# Patient Record
Sex: FEMALE | Race: BLACK OR AFRICAN AMERICAN | NOT HISPANIC OR LATINO | Employment: FULL TIME | URBAN - METROPOLITAN AREA
[De-identification: names, ages, dates, MRNs, and addresses within clinical notes are randomized per-mention and may not be internally consistent; named-entity substitution may affect disease eponyms.]

---

## 2023-09-21 LAB
CLUE CELLS: NORMAL
NUGENT SCORE: 1
YEAST: NORMAL

## 2023-12-05 DIAGNOSIS — B00.9 HERPESVIRAL INFECTION, UNSPECIFIED: ICD-10-CM

## 2023-12-07 ENCOUNTER — APPOINTMENT (OUTPATIENT)
Dept: OBSTETRICS AND GYNECOLOGY | Facility: CLINIC | Age: 33
End: 2023-12-07
Payer: COMMERCIAL

## 2024-01-11 ENCOUNTER — LAB (OUTPATIENT)
Dept: LAB | Facility: LAB | Age: 34
End: 2024-01-11
Payer: COMMERCIAL

## 2024-01-11 ENCOUNTER — OFFICE VISIT (OUTPATIENT)
Dept: OBSTETRICS AND GYNECOLOGY | Facility: CLINIC | Age: 34
End: 2024-01-11
Payer: COMMERCIAL

## 2024-01-11 VITALS
HEIGHT: 66 IN | DIASTOLIC BLOOD PRESSURE: 68 MMHG | BODY MASS INDEX: 32.47 KG/M2 | SYSTOLIC BLOOD PRESSURE: 114 MMHG | WEIGHT: 202 LBS

## 2024-01-11 DIAGNOSIS — E66.09 CLASS 1 OBESITY DUE TO EXCESS CALORIES WITH SERIOUS COMORBIDITY AND BODY MASS INDEX (BMI) OF 30.0 TO 30.9 IN ADULT: ICD-10-CM

## 2024-01-11 DIAGNOSIS — Z01.419 ENCOUNTER FOR ANNUAL ROUTINE GYNECOLOGICAL EXAMINATION: ICD-10-CM

## 2024-01-11 DIAGNOSIS — Z01.419 ENCOUNTER FOR ANNUAL ROUTINE GYNECOLOGICAL EXAMINATION: Primary | ICD-10-CM

## 2024-01-11 DIAGNOSIS — Z87.42 HISTORY OF VAGINITIS: ICD-10-CM

## 2024-01-11 DIAGNOSIS — Z86.19 HISTORY OF HERPES GENITALIS: ICD-10-CM

## 2024-01-11 LAB
25(OH)D3 SERPL-MCNC: 19 NG/ML (ref 30–100)
TSH SERPL-ACNC: 1.51 MIU/L (ref 0.44–3.98)

## 2024-01-11 PROCEDURE — 87624 HPV HI-RISK TYP POOLED RSLT: CPT

## 2024-01-11 PROCEDURE — 87205 SMEAR GRAM STAIN: CPT

## 2024-01-11 PROCEDURE — 3008F BODY MASS INDEX DOCD: CPT | Performed by: OBSTETRICS & GYNECOLOGY

## 2024-01-11 PROCEDURE — 36415 COLL VENOUS BLD VENIPUNCTURE: CPT

## 2024-01-11 PROCEDURE — 99395 PREV VISIT EST AGE 18-39: CPT | Performed by: OBSTETRICS & GYNECOLOGY

## 2024-01-11 PROCEDURE — 88175 CYTOPATH C/V AUTO FLUID REDO: CPT

## 2024-01-11 PROCEDURE — 84443 ASSAY THYROID STIM HORMONE: CPT

## 2024-01-11 PROCEDURE — 1036F TOBACCO NON-USER: CPT | Performed by: OBSTETRICS & GYNECOLOGY

## 2024-01-11 PROCEDURE — 82306 VITAMIN D 25 HYDROXY: CPT

## 2024-01-11 RX ORDER — NAPROXEN SODIUM 220 MG
TABLET ORAL
COMMUNITY
Start: 2010-06-01

## 2024-01-11 RX ORDER — VALACYCLOVIR HCL 100 %
POWDER (GRAM) MISCELLANEOUS
COMMUNITY
Start: 2021-09-15 | End: 2024-01-11 | Stop reason: SDUPTHER

## 2024-01-11 RX ORDER — BLOOD-GLUCOSE METER
KIT MISCELLANEOUS 4 TIMES DAILY
COMMUNITY
Start: 2019-02-25

## 2024-01-11 RX ORDER — LANCETS
EACH MISCELLANEOUS
COMMUNITY
Start: 2010-06-01

## 2024-01-11 RX ORDER — ACETAMINOPHEN 325 MG/1
TABLET ORAL EVERY 6 HOURS
COMMUNITY
Start: 2020-12-13

## 2024-01-11 RX ORDER — AZELASTINE HYDROCHLORIDE 0.5 MG/ML
SOLUTION/ DROPS OPHTHALMIC
COMMUNITY
Start: 2023-01-13

## 2024-01-11 RX ORDER — VALACYCLOVIR HYDROCHLORIDE 500 MG/1
500 TABLET, FILM COATED ORAL DAILY
Qty: 90 TABLET | Refills: 3 | Status: SHIPPED | OUTPATIENT
Start: 2024-01-11 | End: 2025-01-10

## 2024-01-11 NOTE — PATIENT INSTRUCTIONS
Thanks for coming in today for your annual GYN exam.    A Pap smear was sent.  Results should be available in the next few weeks.  However, if you have been unable to review the results by the middle of next month please give the office a call.    Routine cultures are sent today.  Results should be available in the next 24 to 48 hours.  You may call the office and select option number to speak with the nurse to obtain the results.    Follow-up with our fitter me program to help with weight loss.    A refill of your Valtrex has been sent to the pharmacy.    Follow-up with your PCP and other healthcare specialist as needed.    Feel free to call the office with any problems, questions or concerns prior to your next scheduled visit.

## 2024-01-11 NOTE — PROGRESS NOTES
33-year-old obese -0-1-1 -American woman presents today for annual GYN exam.     She has had some depression.  She has been seeing a therapist and states that she is doing okay.    She had BV and believes that it has essentially resolved.  She request a refill of her Valtrex and vitamin D.  She would like some assistance with weight loss.    GynHx reviewed     Subjective   Patient ID: Lj Avilez is a 33 y.o. female who presents for Annual Exam (Med refill /Last PAP=2021 negative /Decline Chaperone. Maxim BRODY MA II/).  Objective   Physical Exam  Exam conducted with a chaperone present.   Constitutional:       Appearance: She is obese.   HENT:      Head: Normocephalic.      Right Ear: External ear normal.      Left Ear: External ear normal.      Nose: Nose normal.      Mouth/Throat:      Mouth: Mucous membranes are moist.   Eyes:      Extraocular Movements: Extraocular movements intact.      Pupils: Pupils are equal, round, and reactive to light.   Cardiovascular:      Rate and Rhythm: Normal rate and regular rhythm.   Pulmonary:      Effort: Pulmonary effort is normal.      Breath sounds: Normal breath sounds.   Chest:   Breasts:     Right: Normal.      Left: Normal.   Abdominal:      Palpations: Abdomen is soft.   Genitourinary:     General: Normal vulva.      Labia:         Right: No rash or lesion.         Left: No rash or lesion.       Vagina: Normal.      Cervix: Normal.      Uterus: Normal.       Adnexa: Right adnexa normal and left adnexa normal.        Right: No tenderness or fullness.          Left: No tenderness or fullness.     Musculoskeletal:         General: Normal range of motion.      Cervical back: Normal range of motion.   Skin:     General: Skin is warm and dry.   Neurological:      General: No focal deficit present.      Mental Status: She is alert and oriented to person, place, and time.   Psychiatric:         Mood and Affect: Mood normal.         Behavior: Behavior normal.          Thought Content: Thought content normal.         Judgment: Judgment normal.     A/P: APE     -  Pap sent     -  PCP F/U     -  Valtrex refill     -  Fitter Me program     -  Declines contraception    -  BV and GC swab for recent vaginitis

## 2024-01-12 LAB
CLUE CELLS VAG LPF-#/AREA: ABNORMAL /[LPF]
NUGENT SCORE: 7
YEAST VAG WET PREP-#/AREA: ABNORMAL

## 2024-01-15 ENCOUNTER — TELEPHONE (OUTPATIENT)
Dept: OBSTETRICS AND GYNECOLOGY | Facility: CLINIC | Age: 34
End: 2024-01-15
Payer: COMMERCIAL

## 2024-01-15 NOTE — TELEPHONE ENCOUNTER
--Attempted to reach pt vm is full  --- Message from Hetal Callahan MD sent at 1/14/2024 10:52 PM EST -----  The patient may use Metrogel, Flagyl or Solesec for BV. Please confirm the patient's preference, allergies and pharmacy and then pend and send rx.

## 2024-01-18 ENCOUNTER — TELEPHONE (OUTPATIENT)
Dept: OBSTETRICS AND GYNECOLOGY | Facility: CLINIC | Age: 34
End: 2024-01-18
Payer: COMMERCIAL

## 2024-01-18 DIAGNOSIS — B96.89 BACTERIAL VAGINOSIS: ICD-10-CM

## 2024-01-18 DIAGNOSIS — E55.9 VITAMIN D DEFICIENCY: Primary | ICD-10-CM

## 2024-01-18 DIAGNOSIS — N76.0 BACTERIAL VAGINOSIS: ICD-10-CM

## 2024-01-18 NOTE — TELEPHONE ENCOUNTER
Pt verified by name and .  Pt is aware of Dr. Callahan's message:  Hetal Callahan MD  [facility] [place] [address] [facility]  Vitamin D 50,000 International Units each week for 12 weeks and then 2,000 Internat'l units daily. Pend and send after confirming pharmacy. Thanks  Pt is also aware she has bv.  Pt would like solosec sent to pharmacy.

## 2024-01-19 RX ORDER — ERGOCALCIFEROL 1.25 MG/1
50000 CAPSULE ORAL
Qty: 12 CAPSULE | Refills: 0 | Status: SHIPPED | OUTPATIENT
Start: 2024-01-19 | End: 2024-05-30

## 2024-01-19 RX ORDER — SECNIDAZOLE 2 G/4.8G
1 GRANULE ORAL ONCE
Qty: 1 EACH | Refills: 0 | Status: SHIPPED | OUTPATIENT
Start: 2024-01-19 | End: 2024-01-19

## 2024-01-30 ENCOUNTER — OFFICE VISIT (OUTPATIENT)
Dept: PRIMARY CARE | Facility: CLINIC | Age: 34
End: 2024-01-30
Payer: COMMERCIAL

## 2024-01-30 ENCOUNTER — LAB (OUTPATIENT)
Dept: LAB | Facility: LAB | Age: 34
End: 2024-01-30
Payer: COMMERCIAL

## 2024-01-30 VITALS
DIASTOLIC BLOOD PRESSURE: 73 MMHG | HEIGHT: 66 IN | BODY MASS INDEX: 32.29 KG/M2 | WEIGHT: 200.9 LBS | HEART RATE: 81 BPM | SYSTOLIC BLOOD PRESSURE: 108 MMHG

## 2024-01-30 DIAGNOSIS — E10.649 UNCONTROLLED TYPE 1 DIABETES MELLITUS WITH HYPOGLYCEMIA WITHOUT COMA (MULTI): ICD-10-CM

## 2024-01-30 DIAGNOSIS — E66.09 CLASS 1 OBESITY DUE TO EXCESS CALORIES WITH SERIOUS COMORBIDITY AND BODY MASS INDEX (BMI) OF 30.0 TO 30.9 IN ADULT: ICD-10-CM

## 2024-01-30 DIAGNOSIS — J45.20 MILD INTERMITTENT EXTRINSIC ASTHMA WITHOUT COMPLICATION (HHS-HCC): Primary | ICD-10-CM

## 2024-01-30 LAB
CHOLEST SERPL-MCNC: 228 MG/DL (ref 0–199)
CHOLESTEROL/HDL RATIO: 4.8
EST. AVERAGE GLUCOSE BLD GHB EST-MCNC: 180 MG/DL
HBA1C MFR BLD: 7.9 %
HDLC SERPL-MCNC: 47.9 MG/DL
LDLC SERPL CALC-MCNC: 164 MG/DL
NON HDL CHOLESTEROL: 180 MG/DL (ref 0–149)
TRIGL SERPL-MCNC: 81 MG/DL (ref 0–149)
VLDL: 16 MG/DL (ref 0–40)

## 2024-01-30 PROCEDURE — 3008F BODY MASS INDEX DOCD: CPT | Performed by: FAMILY MEDICINE

## 2024-01-30 PROCEDURE — 80061 LIPID PANEL: CPT

## 2024-01-30 PROCEDURE — 36415 COLL VENOUS BLD VENIPUNCTURE: CPT

## 2024-01-30 PROCEDURE — 3051F HG A1C>EQUAL 7.0%<8.0%: CPT | Performed by: FAMILY MEDICINE

## 2024-01-30 PROCEDURE — 3078F DIAST BP <80 MM HG: CPT | Performed by: FAMILY MEDICINE

## 2024-01-30 PROCEDURE — 3074F SYST BP LT 130 MM HG: CPT | Performed by: FAMILY MEDICINE

## 2024-01-30 PROCEDURE — 83036 HEMOGLOBIN GLYCOSYLATED A1C: CPT

## 2024-01-30 PROCEDURE — 3050F LDL-C >= 130 MG/DL: CPT | Performed by: FAMILY MEDICINE

## 2024-01-30 PROCEDURE — 99204 OFFICE O/P NEW MOD 45 MIN: CPT | Performed by: FAMILY MEDICINE

## 2024-01-30 PROCEDURE — 1036F TOBACCO NON-USER: CPT | Performed by: FAMILY MEDICINE

## 2024-01-30 PROCEDURE — 99214 OFFICE O/P EST MOD 30 MIN: CPT | Performed by: FAMILY MEDICINE

## 2024-01-30 ASSESSMENT — PAIN SCALES - GENERAL: PAINLEVEL: 0-NO PAIN

## 2024-01-30 NOTE — PROGRESS NOTES
"Lj Gillespie is a 33 y.o. female here for initial evaluation for treatment of obesity.    Past Medical History:   Diagnosis Date    Acute vaginitis     Vaginal infection    Allergy status to unspecified drugs, medicaments and biological substances     History of seasonal allergies    Anxiety disorder, unspecified     Anxiety and depression    Encounter for removal of intrauterine contraceptive device 08/22/2022    Encounter for IUD removal    Other conditions influencing health status     History of pregnancy    Other conditions influencing health status     Termination of pregnancy    Other specified health status     No pertinent past surgical history    Personal history of other specified conditions     History of abnormal Pap smear    Type 1 diabetes mellitus with hypoglycemia with coma (CMS/[facility])     Type 1 diabetes mellitus with hypoglycemic coma    Unspecified sexually transmitted disease     STD (female)       Tobacco Use: Low Risk  (1/30/2024)    Patient History     Smoking Tobacco Use: Never     Smokeless Tobacco Use: Never     Passive Exposure: Not on file     Alcohol Use: Not on file     Of greatest significance, Lj was diagnosed with type 1 diabetes at age 31, which is unusual. She uses an insulin pump and type 1 diabetes has been confirmed. She has experienced some weight gain since stating the insulin pump. She has mild intermittent asthma, but no other chronic illnesses. She neither smokes nor drinks alcohol.   The family history is significant for a father who is 69, obese, and has type 2 diabetes. Her mother is 64 and has asymptomatic carotid stenosis. She has no siblings though her mother. On her father's side, there is a half brother who is morbidly obese and \"on the verge\" of developing type 2 diabetes. Lj has a healthy 11-year-old daughter with whom she lives.     She has tried improved dietary habits for short periods of time and has exercises periodically for short periods, " without sustained effort or weight loss.     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 On a typical day, Lj Avilez wakes up at  6AM, gets her daugher ready for school, but then goes back to sleep until 8AM. She skips breakfast, first eating leftovers around 2PM. She next eats dinner with her daughter around 8PM. Not much snacking. She drinks water and juice. She is sedentary. She feels well today and is motivated to lose weight to look and feel better.       On physical examination   Vitals:    01/30/24 1207   BP: 108/73   Pulse: 81       Body mass index is 32.43 kg/m².      Overall Impression: Very pleasant young woman who is candid about her unhealthy habits. No recent Hba1C% or lipid panel which we'll get today. She is interested in phentermine in the future.    Goals included our Standard Fitter me goals with implementation counseling by Dr. Bertrand Hebert. Followup in 3months.     No food or drink after 7PM, Drink only water at all times., Have a protein-rich breakfast within 30 minutes of waking up., and Thirty minutes of continuous physical activity 7 days a week.

## 2024-02-21 RX ORDER — VALACYCLOVIR HYDROCHLORIDE 1 G/1
1000 TABLET, FILM COATED ORAL DAILY
Qty: 90 TABLET | Refills: 3 | Status: SHIPPED | OUTPATIENT
Start: 2024-02-21

## 2024-05-23 DIAGNOSIS — E55.9 VITAMIN D DEFICIENCY: ICD-10-CM

## 2024-05-30 RX ORDER — ERGOCALCIFEROL 1.25 MG/1
50000 CAPSULE ORAL
Qty: 12 CAPSULE | Refills: 0 | Status: SHIPPED | OUTPATIENT
Start: 2024-06-02 | End: 2024-08-19

## 2024-06-25 ENCOUNTER — APPOINTMENT (OUTPATIENT)
Dept: PRIMARY CARE | Facility: CLINIC | Age: 34
End: 2024-06-25
Payer: COMMERCIAL

## 2024-08-28 ENCOUNTER — OFFICE VISIT (OUTPATIENT)
Dept: OBSTETRICS AND GYNECOLOGY | Facility: CLINIC | Age: 34
End: 2024-08-28
Payer: COMMERCIAL

## 2024-08-28 VITALS
BODY MASS INDEX: 30.66 KG/M2 | HEART RATE: 83 BPM | HEIGHT: 65 IN | DIASTOLIC BLOOD PRESSURE: 78 MMHG | SYSTOLIC BLOOD PRESSURE: 115 MMHG | OXYGEN SATURATION: 98 % | WEIGHT: 184 LBS

## 2024-08-28 DIAGNOSIS — Z11.3 SCREENING EXAMINATION FOR STI: ICD-10-CM

## 2024-08-28 DIAGNOSIS — N89.8 VAGINAL DISCHARGE: Primary | ICD-10-CM

## 2024-08-28 PROCEDURE — 99213 OFFICE O/P EST LOW 20 MIN: CPT

## 2024-08-28 PROCEDURE — 1036F TOBACCO NON-USER: CPT

## 2024-08-28 PROCEDURE — 3078F DIAST BP <80 MM HG: CPT

## 2024-08-28 PROCEDURE — 87661 TRICHOMONAS VAGINALIS AMPLIF: CPT

## 2024-08-28 PROCEDURE — 3050F LDL-C >= 130 MG/DL: CPT

## 2024-08-28 PROCEDURE — 87491 CHLMYD TRACH DNA AMP PROBE: CPT

## 2024-08-28 PROCEDURE — 87591 N.GONORRHOEAE DNA AMP PROB: CPT

## 2024-08-28 PROCEDURE — 3008F BODY MASS INDEX DOCD: CPT

## 2024-08-28 PROCEDURE — 3074F SYST BP LT 130 MM HG: CPT

## 2024-08-28 PROCEDURE — 87205 SMEAR GRAM STAIN: CPT

## 2024-08-28 PROCEDURE — 3051F HG A1C>EQUAL 7.0%<8.0%: CPT

## 2024-08-28 NOTE — PROGRESS NOTES
"Subjective   Patient ID: Lj Avilez is a 34 y.o. female who presents for STD testing.    HPI  Patient presents to the office today with concern for vaginal discharge.  Had an  in early July.  States that she had intercourse sooner than when she was told that she could have intercourse again after having her .  Believes that may have \"thrown something off.\"  Increased vaginal discharge, slight odor.  Denies any itching or burning.  Denies pelvic pain.  Desires STI screening.  Hx of HSV.  States that she has been having more frequent outbreaks in the past few months, taking her Valtrex daily for suppression.  Using condoms.     Review of Systems   Genitourinary:  Positive for vaginal discharge. Negative for pelvic pain.   All other systems reviewed and are negative.      Objective   Physical Exam  Constitutional:       Appearance: Normal appearance.   HENT:      Head: Normocephalic.   Pulmonary:      Effort: Pulmonary effort is normal.   Genitourinary:     General: Normal vulva.      Vagina: Vaginal discharge present.      Cervix: Normal. No lesion.      Comments: Increased, thin white vaginal discharge.  Skin:     General: Skin is warm and dry.   Neurological:      Mental Status: She is alert and oriented to person, place, and time.   Psychiatric:         Mood and Affect: Mood normal.         Assessment/Plan   Diagnoses and all orders for this visit:  Vaginal discharge  -     C. trachomatis + N. gonorrhoeae, Amplified  -     Trichomonas vaginalis, Amplified  -     Vaginitis Gram Stain For Bacterial Vaginosis + Yeast  Screening examination for STI    Will await results prior to treatment.  Prefers PO treatment.     Patient reports understanding; all questions answered at this time.    RASHEED Hollingsworth 24 2:57 PM   "

## 2024-08-29 LAB
C TRACH RRNA SPEC QL NAA+PROBE: NEGATIVE
N GONORRHOEA DNA SPEC QL PROBE+SIG AMP: NEGATIVE
T VAGINALIS RRNA SPEC QL NAA+PROBE: NEGATIVE

## 2024-08-30 LAB
BACTERIAL VAGINOSIS VAG-IMP: NORMAL
CLUE CELLS VAG LPF-#/AREA: NORMAL /[LPF]
NUGENT SCORE: 4
YEAST VAG WET PREP-#/AREA: NORMAL

## 2024-09-04 ENCOUNTER — TELEPHONE (OUTPATIENT)
Dept: OBSTETRICS AND GYNECOLOGY | Facility: CLINIC | Age: 34
End: 2024-09-04
Payer: COMMERCIAL

## 2024-09-04 NOTE — TELEPHONE ENCOUNTER
Contacted pt and verified name and .  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 Pt is concerned that she wasn't being properly treated. It was noted that in 2024 Dr Ewnig wrote for her to take 1000 mg of valtrex daily. Pt states she was only taking 500 mg daily. Pt advised to increase to 1000mg daily an follow up with her pharmacy to if the order Dr. Ewing wrote is still active and if not to call us back.  Patient states understanding and has no questions at this time.

## 2024-10-28 ENCOUNTER — PATIENT MESSAGE (OUTPATIENT)
Dept: OBSTETRICS AND GYNECOLOGY | Facility: CLINIC | Age: 34
End: 2024-10-28
Payer: COMMERCIAL

## 2024-10-28 DIAGNOSIS — B00.9 HERPESVIRAL INFECTION, UNSPECIFIED: ICD-10-CM

## 2024-10-29 RX ORDER — VALACYCLOVIR HYDROCHLORIDE 1 G/1
1000 TABLET, FILM COATED ORAL DAILY
Qty: 90 TABLET | Refills: 0 | Status: SHIPPED | OUTPATIENT
Start: 2024-10-29 | End: 2025-01-27

## 2025-01-16 ENCOUNTER — APPOINTMENT (OUTPATIENT)
Dept: OBSTETRICS AND GYNECOLOGY | Facility: CLINIC | Age: 35
End: 2025-01-16
Payer: COMMERCIAL

## 2025-01-26 DIAGNOSIS — B00.9 HERPESVIRAL INFECTION, UNSPECIFIED: ICD-10-CM

## 2025-01-26 DIAGNOSIS — Z86.19 HISTORY OF HERPES GENITALIS: ICD-10-CM

## 2025-01-29 RX ORDER — VALACYCLOVIR HYDROCHLORIDE 500 MG/1
500 TABLET, FILM COATED ORAL DAILY
Qty: 90 TABLET | Refills: 3 | Status: SHIPPED | OUTPATIENT
Start: 2025-01-29

## 2025-01-29 RX ORDER — VALACYCLOVIR HYDROCHLORIDE 1 G/1
1000 TABLET, FILM COATED ORAL DAILY
Qty: 90 TABLET | Refills: 0 | Status: SHIPPED | OUTPATIENT
Start: 2025-01-29 | End: 2025-04-29

## 2025-03-05 ENCOUNTER — APPOINTMENT (OUTPATIENT)
Dept: OBSTETRICS AND GYNECOLOGY | Facility: CLINIC | Age: 35
End: 2025-03-05
Payer: COMMERCIAL

## 2025-03-05 VITALS
HEIGHT: 65 IN | BODY MASS INDEX: 32.49 KG/M2 | SYSTOLIC BLOOD PRESSURE: 106 MMHG | WEIGHT: 195 LBS | DIASTOLIC BLOOD PRESSURE: 60 MMHG

## 2025-03-05 DIAGNOSIS — Z11.3 ROUTINE SCREENING FOR STI (SEXUALLY TRANSMITTED INFECTION): Primary | ICD-10-CM

## 2025-03-05 DIAGNOSIS — Z30.09 ENCOUNTER FOR COUNSELING REGARDING CONTRACEPTION: ICD-10-CM

## 2025-03-05 PROCEDURE — 3074F SYST BP LT 130 MM HG: CPT | Performed by: OBSTETRICS & GYNECOLOGY

## 2025-03-05 PROCEDURE — 1036F TOBACCO NON-USER: CPT | Performed by: OBSTETRICS & GYNECOLOGY

## 2025-03-05 PROCEDURE — 3078F DIAST BP <80 MM HG: CPT | Performed by: OBSTETRICS & GYNECOLOGY

## 2025-03-05 PROCEDURE — 3008F BODY MASS INDEX DOCD: CPT | Performed by: OBSTETRICS & GYNECOLOGY

## 2025-03-05 PROCEDURE — 99395 PREV VISIT EST AGE 18-39: CPT | Performed by: OBSTETRICS & GYNECOLOGY

## 2025-03-05 RX ORDER — NORETHINDRONE ACETATE AND ETHINYL ESTRADIOL 1MG-20(21)
1 KIT ORAL DAILY
Qty: 28 TABLET | Refills: 11 | Status: SHIPPED | OUTPATIENT
Start: 2025-03-05 | End: 2026-03-05

## 2025-03-05 ASSESSMENT — ENCOUNTER SYMPTOMS
SHORTNESS OF BREATH: 0
WEAKNESS: 0
HEADACHES: 0
DYSURIA: 0
PALPITATIONS: 0
DIARRHEA: 0
FEVER: 0
HEMATURIA: 0
DIZZINESS: 0
ABDOMINAL PAIN: 0
CONSTIPATION: 0
FREQUENCY: 0
NAUSEA: 0
CHILLS: 0
VOMITING: 0
COUGH: 0

## 2025-03-05 ASSESSMENT — PAIN SCALES - GENERAL: PAINLEVEL_OUTOF10: 0-NO PAIN

## 2025-03-05 NOTE — PROGRESS NOTES
Well Woman Visit    SUBJECTIVE  34 y.o.  female presents for well woman exam     OB/GYN History  OB History    Para Term  AB Living   3 1 1   2 1   SAB IAB Ectopic Multiple Live Births     2     1      # Outcome Date GA Lbr Vinod/2nd Weight Sex Type Anes PTL Lv   3 Term      Vag-Spont   АННА   2 IAB            1 IAB                Menstrual status: Having periods  Patient's last menstrual period was 2025 (exact date).  Menses: Regular  Abnormal bleeding: No  Discharge: No  Pelvic pain: No  Pelvic prolapse: No  Urinary/fecal incontinence or overactive bladder: No  Breast concerns: No  Mood concerns: No    Social History     Substance and Sexual Activity   Sexual Activity Yes    Partners: Male    Birth control/protection: Condom Male     Sexually transmitted infections:no past history  History of abnormal pap: No  Last pap: approximate date 2024 and was normal  Sexual concerns: No  Desire to become pregnant in the next year: No    Other: Taking valtrex 1000 mg BID. Discussed I think it reasonable to do 500 mg BID or 1g daily given she is immunocompromised but 1 g BID may increase risk of side effects     The following portions of the chart were reviewed this encounter and updated as appropriate:    Tobacco  Allergies  Meds  Problems  Med Hx  Surg Hx  Fam Hx          Screenings  Social Drivers of Health     Tobacco Use: Low Risk  (3/5/2025)    Patient History     Smoking Tobacco Use: Never     Smokeless Tobacco Use: Never     Passive Exposure: Not on file   Alcohol Use: Not At Risk (2024)    Received from [facility]    AUDIT-C     Frequency of Alcohol Consumption: Monthly or less     Average Number of Drinks: 1 or 2     Frequency of Binge Drinking: Less than monthly   Financial Resource Strain: Low Risk  (2024)    Received from [facility]    Overall Financial Resource Strain (CARDIA)     Difficulty of Paying Living Expenses: Not hard at all   Food Insecurity: No  Food Insecurity (6/27/2024)    Received from [facility]    Hunger Vital Sign     Worried About Running Out of Food in the Last Year: Never true     Ran Out of Food in the Last Year: Never true   Transportation Needs: No Transportation Needs (6/27/2024)    Received from [facility]    PRAPARE - Transportation     Lack of Transportation (Medical): No     Lack of Transportation (Non-Medical): No   Physical Activity: Insufficiently Active (6/27/2024)    Received from [facility]    Exercise Vital Sign     Days of Exercise per Week: 1 day     Minutes of Exercise per Session: 10 min   Stress: No Stress Concern Present (6/27/2024)    Received from [facility]    [place] [employer] - Occupational Stress Questionnaire     Feeling of Stress : Not at all   Social Connections: Socially Isolated (6/27/2024)    Received from [facility]    Social Connection and Isolation Panel [NHANES]     Frequency of Communication with Friends and Family: More than three times a week     Frequency of Social Gatherings with Friends and Family: Once a week     Attends Spiritism Services: Never     Active Member of Clubs or Organizations: No     Attends Club or Organization Meetings: Never     Marital Status: Never    Intimate Partner Violence: Not on file   Depression: Not at risk (1/17/2023)    Received from [facility], [facility]    PHQ-2     PHQ-2 score: 2   Housing Stability: Low Risk  (5/26/2023)    Received from [facility]    Housing Stability Vital Sign     Unable to Pay for Housing in the Last Year: No     Number of Places Lived in the Last Year: 1     In the last 12 months, was there a time when you did not have a steady place to sleep or slept in a shelter (including now)?: No   Utilities: Not At Risk (6/27/2024)    Received from [facility] Utilities     Threatened with loss of utilities: No   Digital Equity: Not on file   Health Literacy: Not on file  "        Hereditary Cancer Risk Assessment:   Family History   Problem Relation Name Age of Onset    Diabetes Maternal Grandfather Rafa Russ     Stroke Maternal Grandfather Rafa Russ     Breast cancer Neg Hx      Uterine cancer Neg Hx      Colon cancer Neg Hx      Ovarian cancer Neg Hx          Review of Systems  Review of Systems   Constitutional:  Negative for chills and fever.   Eyes:  Negative for visual disturbance.   Respiratory:  Negative for cough and shortness of breath.    Cardiovascular:  Negative for chest pain and palpitations.   Gastrointestinal:  Negative for abdominal pain, constipation, diarrhea, nausea and vomiting.   Genitourinary:  Negative for dyspareunia, dysuria, frequency, hematuria, urgency, vaginal bleeding and vaginal discharge.   Neurological:  Negative for dizziness, weakness and headaches.            OBJECTIVE  Vitals:    03/05/25 1553   BP: 106/60   Weight: 88.5 kg (195 lb)   Height: 1.651 m (5' 5\")     Body mass index is 32.45 kg/m².      Physical Exam  Constitutional:       General: She is not in acute distress.     Appearance: Normal appearance.   Genitourinary:      Vulva and rectum normal.      Right Labia: No skin changes.     Left Labia: No skin changes.     No vaginal discharge.        Right Adnexa: not tender and no mass present.     Left Adnexa: not tender and no mass present.     No cervical lesion.      Uterus is not tender or irregular.   Breasts:     Breasts are symmetrical.      Right: Normal.      Left: Normal.   HENT:      Head: Normocephalic and atraumatic.      Nose: Nose normal.      Mouth/Throat:      Mouth: Mucous membranes are moist.      Pharynx: Oropharynx is clear.   Eyes:      Extraocular Movements: Extraocular movements intact.      Conjunctiva/sclera: Conjunctivae normal.      Pupils: Pupils are equal, round, and reactive to light.   Cardiovascular:      Rate and Rhythm: Normal rate.      Pulses: Normal pulses.   Pulmonary:      Effort: Pulmonary " effort is normal.   Abdominal:      General: Abdomen is flat. There is no distension.      Palpations: Abdomen is soft.      Tenderness: There is no abdominal tenderness. There is no guarding or rebound.   Musculoskeletal:         General: Normal range of motion.   Neurological:      General: No focal deficit present.      Mental Status: She is alert and oriented to person, place, and time.   Skin:     General: Skin is warm and dry.   Psychiatric:         Mood and Affect: Mood normal.         Behavior: Behavior normal.   Vitals reviewed.          Immunization History   Administered Date(s) Administered    HPV, Quadrivalent 10/03/2006, 01/02/2007, 04/12/2007         ASSESSMENT AND PLAN  -Well woman screenings performed today  -Reviewed cervical cancer screening recommendations. Gardasil received  -Discussed reproductive life plan, folic acid supplementation, contraception, emergency contraception   -Encouraged routine wellness exams with PCP Svetlana Harrell MD     -Issues identified and addressed today:   Problem List Items Addressed This Visit    None  Visit Diagnoses       Routine screening for STI (sexually transmitted infection)    -  Primary    Relevant Orders    Trichomonas vaginalis, Amplified    Chlamydia trachomatis, Amplified, Urogenital    Neisseria gonorrhoeae, Amplified, Urogenital    Hepatitis C Antibody    Hepatitis B Surface Antigen    Syphilis Screen with Reflex    HIV 1/2 Antigen/Antibody Screen with Reflex to Confirmation    Encounter for counseling regarding contraception        No estrogen contraindications. Reviewed contracpetive options interested in pills. Reviewed OCP versus POP. Would like to try low dose OCP    Relevant Medications    norethindrone-e.estradioL-iron (Junel FE 1/20) 1 mg-20 mcg (21)/75 mg (7) tablet          Follow up 1 year, sooner if needed    Estelita Fernando MD  Obstetrics & Gynecology  03/05/25

## 2025-03-06 ENCOUNTER — TELEPHONE (OUTPATIENT)
Dept: OBSTETRICS AND GYNECOLOGY | Facility: CLINIC | Age: 35
End: 2025-03-06
Payer: COMMERCIAL

## 2025-03-06 DIAGNOSIS — A59.9 TRICHOMONAS VAGINALIS INFECTION: Primary | ICD-10-CM

## 2025-03-06 DIAGNOSIS — A59.01 TRICHOMONAS VAGINITIS: Primary | ICD-10-CM

## 2025-03-06 LAB
C TRACH RRNA SPEC QL NAA+PROBE: NOT DETECTED
N GONORRHOEA RRNA SPEC QL NAA+PROBE: NOT DETECTED
QUEST GC CT AMPLIFIED (ALWAYS MESSAGE): NORMAL
QUEST GC CT AMPLIFIED (ALWAYS MESSAGE): NORMAL
T VAGINALIS RRNA SPEC QL NAA+PROBE: DETECTED

## 2025-03-06 RX ORDER — METRONIDAZOLE 500 MG/1
500 TABLET ORAL 2 TIMES DAILY
Qty: 14 TABLET | Refills: 0 | Status: SHIPPED | OUTPATIENT
Start: 2025-03-06 | End: 2025-03-13

## 2025-03-06 RX ORDER — TINIDAZOLE 500 MG/1
2 TABLET ORAL ONCE
Qty: 4 TABLET | Refills: 0 | Status: SHIPPED | OUTPATIENT
Start: 2025-03-06 | End: 2025-03-07 | Stop reason: SDUPTHER

## 2025-03-06 NOTE — TELEPHONE ENCOUNTER
Pt contacted by phone.  Pt did see + trich result on her MyChart.   Discussed EPT for partner.   Pt would like him treated, too.  Nurse will make provider aware.

## 2025-03-06 NOTE — TELEPHONE ENCOUNTER
Pt called office again to state she does not tolerate flagyl well.  Pt asking for another medication option.  Pt remembers taking tinidazole in past.  Nurse will make provider aware.

## 2025-03-06 NOTE — TELEPHONE ENCOUNTER
Pt contacted and informed dr mira jacob with medication change for her.  Also knows we will be calling in RX for flagyl for partner.  Understanding voiced.

## 2025-03-06 NOTE — TELEPHONE ENCOUNTER
----- Message from Estelita Fernando sent at 3/6/2025  3:01 PM EST -----  Hi - Can we call Lj about positive trichomonas test and discuss partner treatment. Thanks!

## 2025-03-07 RX ORDER — TINIDAZOLE 500 MG/1
2 TABLET ORAL ONCE
Qty: 4 TABLET | Refills: 0 | Status: SHIPPED | OUTPATIENT
Start: 2025-03-07 | End: 2025-03-07

## 2025-03-07 NOTE — TELEPHONE ENCOUNTER
Received call from pt today stating she accidentally knocked over her pill bottle and the 4 tablets of tinidazole went down the sink drain. Pt needs a new Rx sent.  Office will make provider aware.

## 2025-03-24 ENCOUNTER — PATIENT MESSAGE (OUTPATIENT)
Dept: OBSTETRICS AND GYNECOLOGY | Facility: CLINIC | Age: 35
End: 2025-03-24
Payer: COMMERCIAL

## 2025-03-24 DIAGNOSIS — Z11.3 ROUTINE SCREENING FOR STI (SEXUALLY TRANSMITTED INFECTION): Primary | ICD-10-CM

## 2025-03-25 LAB — T VAGINALIS RRNA SPEC QL NAA+PROBE: NOT DETECTED

## 2025-03-27 LAB
HBV SURFACE AG SERPL QL IA: NORMAL
HCV AB SERPL QL IA: NORMAL
HIV 1+2 AB+HIV1 P24 AG SERPL QL IA: NORMAL
T PALLIDUM AB SER QL IA: NEGATIVE

## 2025-04-22 ENCOUNTER — APPOINTMENT (OUTPATIENT)
Dept: OBSTETRICS AND GYNECOLOGY | Facility: CLINIC | Age: 35
End: 2025-04-22
Payer: COMMERCIAL

## 2025-05-04 DIAGNOSIS — B00.9 HERPESVIRAL INFECTION, UNSPECIFIED: ICD-10-CM

## 2025-05-07 RX ORDER — VALACYCLOVIR HYDROCHLORIDE 1 G/1
1000 TABLET, FILM COATED ORAL DAILY
Qty: 90 TABLET | Refills: 3 | Status: SHIPPED | OUTPATIENT
Start: 2025-05-07 | End: 2025-08-05